# Patient Record
Sex: MALE | Race: OTHER | HISPANIC OR LATINO | URBAN - METROPOLITAN AREA
[De-identification: names, ages, dates, MRNs, and addresses within clinical notes are randomized per-mention and may not be internally consistent; named-entity substitution may affect disease eponyms.]

---

## 2022-02-21 ENCOUNTER — OFFICE (OUTPATIENT)
Dept: URBAN - METROPOLITAN AREA CLINIC 79 | Facility: CLINIC | Age: 62
End: 2022-02-21

## 2022-02-21 VITALS
HEIGHT: 66 IN | TEMPERATURE: 97.3 F | WEIGHT: 160 LBS | SYSTOLIC BLOOD PRESSURE: 123 MMHG | DIASTOLIC BLOOD PRESSURE: 71 MMHG | HEART RATE: 72 BPM

## 2022-02-21 DIAGNOSIS — R10.30 LOWER ABDOMINAL PAIN, UNSPECIFIED: ICD-10-CM

## 2022-02-21 DIAGNOSIS — K62.5 HEMORRHAGE OF ANUS AND RECTUM: ICD-10-CM

## 2022-02-21 DIAGNOSIS — R19.5 OTHER FECAL ABNORMALITIES: ICD-10-CM

## 2022-02-21 PROCEDURE — 00031: CPT | Performed by: INTERNAL MEDICINE

## 2022-02-21 PROCEDURE — 99204 OFFICE O/P NEW MOD 45 MIN: CPT | Performed by: PHYSICIAN ASSISTANT

## 2022-02-21 NOTE — SERVICEHPINOTES
CHRISTAL DENTON   is a   62   year old    white  male who is being seen in consultation at the request of   JAIME SOW   for ov prior to colonoscopy due to new rectal bleeding concern. He recalls onset of BRBPE x 1 year ago in 2021 Evens once a week or less often seen in wipe, toilet bowl water, and in stool. He informs of BMs 4-6 x/day, BSS type 5-6 consistency with "thin stools"  No pain defecation. He has lower abdominal pains, described as "cramping/twisting" sensation felt prior to defecation: Pain does improve with passing stool. He mentions h/o severe acute diarrhea around [place] time s/p eating pork which lasted several days No diagnostic tests or treatment given for this concern as it resolved on its own. He feels that this acute GI event in Dec of 2021 "catapulted" his GI symptoms. Rare NSAID use. No known cardiac or pulmonary disease. Denies chest pain, n/v, upper abdominal pain, diarrhea, melena, weight loss. br
br

## 2022-02-25 LAB
GIARDIA AND CRYPTOSPORIDIUM ANTIGEN PANEL: CRYPTOSPORIDIUM ANTIGEN, EIA: (no result)
GIARDIA AND CRYPTOSPORIDIUM ANTIGEN PANEL: GIARDIA AG, EIA, STOOL: (no result)
HELICOBACTER PYLORI AG, EIA, STOOL: ABNORMAL
OVA AND PARASITES, CONC/PERM SMEAR, 3 SPEC: (no result)

## 2022-03-02 ENCOUNTER — OFFICE (OUTPATIENT)
Dept: URBAN - METROPOLITAN AREA CLINIC 30 | Facility: CLINIC | Age: 62
End: 2022-03-02

## 2022-03-02 ENCOUNTER — OFFICE (OUTPATIENT)
Dept: URBAN - METROPOLITAN AREA PATHOLOGY 18 | Facility: PATHOLOGY | Age: 62
End: 2022-03-02

## 2022-03-02 VITALS
OXYGEN SATURATION: 98 % | HEIGHT: 66 IN | RESPIRATION RATE: 21 BRPM | RESPIRATION RATE: 18 BRPM | RESPIRATION RATE: 16 BRPM | HEART RATE: 84 BPM | WEIGHT: 160 LBS | SYSTOLIC BLOOD PRESSURE: 112 MMHG | OXYGEN SATURATION: 99 % | SYSTOLIC BLOOD PRESSURE: 136 MMHG | DIASTOLIC BLOOD PRESSURE: 69 MMHG | TEMPERATURE: 98.2 F | HEART RATE: 90 BPM | DIASTOLIC BLOOD PRESSURE: 107 MMHG | TEMPERATURE: 97.5 F | SYSTOLIC BLOOD PRESSURE: 120 MMHG | HEART RATE: 74 BPM | HEART RATE: 83 BPM | OXYGEN SATURATION: 100 % | DIASTOLIC BLOOD PRESSURE: 78 MMHG | DIASTOLIC BLOOD PRESSURE: 63 MMHG | SYSTOLIC BLOOD PRESSURE: 105 MMHG

## 2022-03-02 DIAGNOSIS — C20 MALIGNANT NEOPLASM OF RECTUM: ICD-10-CM

## 2022-03-02 DIAGNOSIS — R19.5 OTHER FECAL ABNORMALITIES: ICD-10-CM

## 2022-03-02 DIAGNOSIS — K62.5 HEMORRHAGE OF ANUS AND RECTUM: ICD-10-CM

## 2022-03-02 DIAGNOSIS — K56.601 COMPLETE INTESTINAL OBSTRUCTION, UNSPECIFIED AS TO CAUSE: ICD-10-CM

## 2022-03-02 DIAGNOSIS — R10.30 LOWER ABDOMINAL PAIN, UNSPECIFIED: ICD-10-CM

## 2022-03-02 PROBLEM — K56.600 PARTIAL INTESTINAL OBSTRUCTION, UNSPECIFIED AS TO CAUSE: Status: ACTIVE | Noted: 2022-03-02

## 2022-03-02 PROCEDURE — 00013: CPT | Performed by: INTERNAL MEDICINE

## 2022-03-02 PROCEDURE — 04311: CPT | Performed by: NURSE ANESTHETIST, CERTIFIED REGISTERED

## 2022-03-02 PROCEDURE — 88305 TISSUE EXAM BY PATHOLOGIST: CPT | Performed by: PATHOLOGY

## 2022-03-02 PROCEDURE — 45335 SIGMOIDOSCOPY W/SUBMUC INJ: CPT | Performed by: INTERNAL MEDICINE

## 2022-03-04 PROBLEM — D37.4 NEOPLASM OF UNCERTAIN BEHAVIOR OF COLON: Status: ACTIVE | Noted: 2022-03-02

## 2022-03-04 PROBLEM — K56.601 COMPLETE INTESTINAL OBSTRUCTION, UNSPECIFIED AS TO CAUSE: Status: ACTIVE | Noted: 2022-03-02
